# Patient Record
Sex: FEMALE | Race: WHITE | NOT HISPANIC OR LATINO | Employment: FULL TIME | URBAN - METROPOLITAN AREA
[De-identification: names, ages, dates, MRNs, and addresses within clinical notes are randomized per-mention and may not be internally consistent; named-entity substitution may affect disease eponyms.]

---

## 2023-04-12 LAB
ERYTHROCYTE DISTRIBUTION WIDTH (RATIO) BY AUTOMATED COUNT: 11.7 % (ref 11.5–14.5)
ERYTHROCYTE MEAN CORPUSCULAR HEMOGLOBIN CONCENTRATION (G/DL) BY AUTOMATED: 33.2 G/DL (ref 32–36)
ERYTHROCYTE MEAN CORPUSCULAR VOLUME (FL) BY AUTOMATED COUNT: 92 FL (ref 80–100)
ERYTHROCYTES (10*6/UL) IN BLOOD BY AUTOMATED COUNT: 4.3 X10E12/L (ref 4–5.2)
HEMATOCRIT (%) IN BLOOD BY AUTOMATED COUNT: 39.5 % (ref 36–46)
HEMOGLOBIN (G/DL) IN BLOOD: 13.1 G/DL (ref 12–16)
LEUKOCYTES (10*3/UL) IN BLOOD BY AUTOMATED COUNT: 6.8 X10E9/L (ref 4.4–11.3)
NRBC (PER 100 WBCS) BY AUTOMATED COUNT: 0 /100 WBC (ref 0–0)
PLATELETS (10*3/UL) IN BLOOD AUTOMATED COUNT: 335 X10E9/L (ref 150–450)

## 2023-04-25 ENCOUNTER — HOSPITAL ENCOUNTER (OUTPATIENT)
Dept: DATA CONVERSION | Facility: HOSPITAL | Age: 38
End: 2023-04-25
Attending: OBSTETRICS & GYNECOLOGY | Admitting: OBSTETRICS & GYNECOLOGY
Payer: COMMERCIAL

## 2023-04-25 DIAGNOSIS — I10 ESSENTIAL (PRIMARY) HYPERTENSION: ICD-10-CM

## 2023-04-25 DIAGNOSIS — Z30.2 ENCOUNTER FOR STERILIZATION: ICD-10-CM

## 2023-04-25 DIAGNOSIS — Z79.52 LONG TERM (CURRENT) USE OF SYSTEMIC STEROIDS: ICD-10-CM

## 2023-04-25 DIAGNOSIS — E03.9 HYPOTHYROIDISM, UNSPECIFIED: ICD-10-CM

## 2023-05-19 ENCOUNTER — HOSPITAL ENCOUNTER (OUTPATIENT)
Dept: DATA CONVERSION | Facility: HOSPITAL | Age: 38
End: 2023-05-19
Attending: OBSTETRICS & GYNECOLOGY | Admitting: OBSTETRICS & GYNECOLOGY
Payer: COMMERCIAL

## 2023-05-19 DIAGNOSIS — F32.A DEPRESSION, UNSPECIFIED: ICD-10-CM

## 2023-05-19 DIAGNOSIS — I10 ESSENTIAL (PRIMARY) HYPERTENSION: ICD-10-CM

## 2023-05-19 DIAGNOSIS — F41.9 ANXIETY DISORDER, UNSPECIFIED: ICD-10-CM

## 2023-05-19 DIAGNOSIS — E03.9 HYPOTHYROIDISM, UNSPECIFIED: ICD-10-CM

## 2023-05-19 DIAGNOSIS — Z30.2 ENCOUNTER FOR STERILIZATION: ICD-10-CM

## 2023-05-19 DIAGNOSIS — F90.0 ATTENTION-DEFICIT HYPERACTIVITY DISORDER, PREDOMINANTLY INATTENTIVE TYPE: ICD-10-CM

## 2023-05-24 LAB
COMPLETE PATHOLOGY REPORT: NORMAL
CONVERTED CLINICAL DIAGNOSIS-HISTORY: NORMAL
CONVERTED FINAL DIAGNOSIS: NORMAL
CONVERTED FINAL REPORT PDF LINK TO COPY AND PASTE: NORMAL
CONVERTED GROSS DESCRIPTION: NORMAL

## 2023-09-07 VITALS — WEIGHT: 152.34 LBS | HEIGHT: 63 IN | BODY MASS INDEX: 26.99 KG/M2

## 2023-09-07 VITALS — HEIGHT: 63 IN | BODY MASS INDEX: 27.58 KG/M2 | WEIGHT: 155.65 LBS

## 2023-09-30 NOTE — H&P
History of Present Illness:   Pregnant/Lactating:  ·  Are You Pregnant no   ·  Are You Currently Breastfeeding no     History Present Illness:  Reason for surgery: desires permanent sterilization   HPI:    36 yo  presents for laparoscopic bilateral salpingectomy for sterilization.   Pt currently using Mirena IUD for birth control and cycle control.     Med Hx: hypothyroid  Surg Hx: CD x 1        Allergies:        Allergies:  ·  No Known Allergies :     Home Medication Review:   Home Medications Reviewed: yes     Impression/Procedure:   ·  Impression and Planned Procedure: plan for laparoscopic bilateral salpingectomy for permanent sterilization       ERAS (Enhanced Recovery After Surgery):  ·  ERAS Patient: no       Physical Exam by System:    Constitutional: Well developed, awake/alert/oriented  x3, no distress, alert and cooperative   Respiratory/Thorax: Patent airways, CTAB, normal  breath sounds with good chest expansion, thorax symmetric   Cardiovascular: Regular, rate and rhythm, no murmurs,  2+ equal pulses of the extremities   Gastrointestinal: abdomen soft, nontender   Extremities: normal extremities, no cyanosis edema,  contusions or wounds, no clubbing   Psychological: Appropriate mood and behavior   Skin: Warm and dry, no lesions, no rashes     Consent:   COVID-19 Consent:  ·  COVID-19 Risk Consent Surgeon has reviewed key risks related to the risk of wen COVID-19 and if they contract COVID-19 what the risks are.       Electronic Signatures:  Beba Arshad)  (Signed 19-May-2023 14:16)   Authored: History of Present Illness, Allergies, Home  Medication Review, Impression/Procedure, ERAS, Physical Exam, Consent, Note Completion      Last Updated: 19-May-2023 14:16 by Beba Arshad)

## 2023-10-02 NOTE — OP NOTE
Post Operative Note:     PreOp Diagnosis: desires permanent sterilization   Post-Procedure Diagnosis: desires permanent sterilization,  unable to complete surgery today   Procedure: 1.  exam under anesthesia   2.   3.   4.   5.   Surgeon: Dr. Beba Arshad   Resident/Fellow/Other Assistant: Dr. Griselda Lares   Anesthesia: general   Estimated Blood Loss (mL): none   Specimen: no   Findings: supra umbilical and infra umbilical piercings  in place with questionable infection     Operative Report Dictated:  Dictation: not applicable - note contains Operative  Report   Operative Report:    The patient was taken o the operating room where general anesthesia was obtained without difficulty.  The patient's abdomen was then examined to get ready for prep  and pt found to have an infraumbilical and supraumbilical piercing in place with questionable infection of the infraumbilical piercing.  The patient had denied piercings or jewelry in pre-operative questioning.  The patient was previously aware that surgery  would be done at the umbilicus.  Decision was made to discontinue the procedure today d/t risk of infection and risk of removing the piercings against the patient's wishes.  The patient was woken up from anesthesia with no procedure being performed.   The patient was taken to the recovery room in stable condition.       Electronic Signatures:  Beba Arshad)  (Signed 25-Apr-2023 10:45)   Authored: Post Operative Note, Note Completion      Last Updated: 25-Apr-2023 10:45 by Beba Arshad)

## 2023-10-02 NOTE — OP NOTE
Post Operative Note:     PreOp Diagnosis: desires permanent sterilization   Post-Procedure Diagnosis: bilateral salpingectomy   Procedure: 1.  laparoscopic bilateral salpingectomy   2. lysis of adhesions   3.   4.   5.   Surgeon: Dr. Arshad   Resident/Fellow/Other Assistant:    Anesthesia: PADMINI   Estimated Blood Loss (mL): none   Specimen: yes. portion of right fallopian tube ,  left fallopian tube   Findings: dense adhesions from uterus to anterior  abdominal wall ; fallopian tubes adhesed to bilateral sidewalls ; dense mesenteric adhesions all through mid pelvis to anterior abdominal wall   Urine Output: 150 mL     Operative Report Dictated:  Dictation: not applicable - note contains Operative  Report   Operative Report:    The patient was taken o the operating room where general anesthesia was obtained without difficulty.  The patient was then examined under anesthesia and found to  have an anteverted uterus with normal adnexa.  The patient's bladder was emptied via straight cath and clear yellow urine was noted.  She was then placed in dorsal lithotomy position and prepped and draped in the usual sterile fashion.  A speculum was  placed in the vagina and the anterior lip of the cervix was grasped with a single tooth tenaculum.  A uterine maniuplator was placed.  The tenaculum and speculum were removed from the vagina.  Attention was then turned to the abdomen where a 10mm skin  incision was made in the umbilical fold.  A quinton clamp was used to bluntly dissect down to the fascia.  Two S retractors were used to displace the abdominal fat and a kocher clamp was used to grasp the fascia and it was entered sharply with the lyons  scissors.  The incision was palpated and no adhesions found around the fascial incision.  The fascia was tagged with 0 vicryl.  A 10 mm luke trocar was advanced.   Pneumoperitoneium was obtained.  Initial views of the pelvis showed dense adhesive disease.   There was significant  adhesions from the mesentery to the anterior abdominal wall all along the mid pelvis.  A second skin incision was made in the LLQ and a 5 mm trocar was advanced under direct visualization.  The same process was used for third trocar  in the RLQ.  The patient was placed in trendelenburg position. The left fallopian tube was able to be visualized.  It was adhesed to the left pelvic side wall. Immediately adjacent to the fallopian tube the ureter was noted. The right fallopian tube was  not able to be visualized easily d/t the mesenteric adhesions.  The ligasure device was advanced and the adhesions to the right abdominal wall were carefully cauterized and removed.  Careful attention was used to make sure there was no small bowl in the  adhesive tissue.  After removal of adhesions the right fallopian tube was visualized and it was also noted to be adhesed to the right pelvic side wall.  The isthmus of the right fallopian tube was grasped with the ligasure, fulgrated and resected.  The  fallopian tube was fulgrated and resected all along the mesosalpinx until the fimbria.  The fimbrial portion of the tube was noted to be adhesed into the right pelvic sidewall and no attempt was made to remove it as most of the tube had been removed.  The fallopian tube was removed through the right 5 mm port.  The left fallopian tube was stabilized using a grasper and a ligasure device was used to fulgrate and dissect the tube away from the mesosalpinx and the ovary and also used to come across the  tubal insertion into the uterus.   The tube was able to be gently pulled away from the sidewall where filmy adhesions were fulgrated and resected.  The ureter was directly visualized during the resection and at no time was it close to the ligasure device.   The fallopian tube was then removed through the right 5 mm port.  Both fallopian tubes were sent for permanent pathology.   The operative sites were examined and found to be hemostatic.   Careful attention was paid to the mesentery that had been resected  from the anterior abdominal wall and the anterior abdominal wall sites.  Good hemostasis was noted.   The RLQ and LLQ 5 mm trocars were removed under direct visualization with good hemostasis noted.  The 10 mm trocar was then removed from the umbilical  port site and as much CO2 as possible was forced from the abdomen.  The umbilical fascia was closed with 0 vicryl in a running fashion.  The three skin incisions were closed with 4-0 vicryl in a buried fasion and dermabond was applied to the incisions.   The uterine manipulator was removed from the vaginal and good hemostasis was noted on the cervix.  All sponge and needle counts were correct. The patient was taken to the recovery room in stable condition.      Attestation:   Note Completion:  Attending Attestation I performed the procedure without a resident         Electronic Signatures:  Beba Arshad)  (Signed 19-May-2023 15:45)   Authored: Post Operative Note, Note Completion      Last Updated: 19-May-2023 15:45 by Beba Arshad)

## 2024-03-18 ENCOUNTER — OFFICE VISIT (OUTPATIENT)
Dept: OBSTETRICS AND GYNECOLOGY | Facility: CLINIC | Age: 39
End: 2024-03-18
Payer: COMMERCIAL

## 2024-03-18 VITALS
SYSTOLIC BLOOD PRESSURE: 110 MMHG | WEIGHT: 176 LBS | BODY MASS INDEX: 31.18 KG/M2 | HEIGHT: 63 IN | DIASTOLIC BLOOD PRESSURE: 80 MMHG

## 2024-03-18 DIAGNOSIS — R10.31 RLQ ABDOMINAL PAIN: ICD-10-CM

## 2024-03-18 DIAGNOSIS — T83.32XA INTRAUTERINE CONTRACEPTIVE DEVICE THREADS LOST, INITIAL ENCOUNTER: Primary | ICD-10-CM

## 2024-03-18 PROCEDURE — 99213 OFFICE O/P EST LOW 20 MIN: CPT | Performed by: OBSTETRICS & GYNECOLOGY

## 2024-03-18 RX ORDER — LEVOTHYROXINE SODIUM 100 UG/1
TABLET ORAL
COMMUNITY
Start: 2021-11-06

## 2024-03-18 RX ORDER — BUPROPION HYDROCHLORIDE 150 MG/1
TABLET ORAL
COMMUNITY
Start: 2024-03-14

## 2024-03-18 RX ORDER — SERTRALINE HYDROCHLORIDE 100 MG/1
100 TABLET, FILM COATED ORAL
COMMUNITY
Start: 2022-01-26

## 2024-03-18 RX ORDER — PROPRANOLOL HYDROCHLORIDE 80 MG/1
CAPSULE, EXTENDED RELEASE ORAL
COMMUNITY
Start: 2021-11-12

## 2024-03-18 RX ORDER — TRAZODONE HYDROCHLORIDE 150 MG/1
TABLET ORAL
COMMUNITY
Start: 2024-03-01

## 2024-03-18 RX ORDER — NALTREXONE HYDROCHLORIDE 50 MG/1
TABLET, FILM COATED ORAL
COMMUNITY
Start: 2024-03-01

## 2024-03-18 ASSESSMENT — ENCOUNTER SYMPTOMS
HEMATURIA: 0
APPETITE CHANGE: 0
VOMITING: 0
BACK PAIN: 0
ABDOMINAL DISTENTION: 0
FATIGUE: 0
SLEEP DISTURBANCE: 0
NAUSEA: 0
COLOR CHANGE: 0
ABDOMINAL PAIN: 0
DIARRHEA: 0
FREQUENCY: 0
FEVER: 0
SHORTNESS OF BREATH: 0
BLOOD IN STOOL: 0
CHILLS: 0
CONSTIPATION: 0
FLANK PAIN: 0
UNEXPECTED WEIGHT CHANGE: 0
DYSURIA: 0

## 2024-03-18 ASSESSMENT — PAIN SCALES - GENERAL: PAINLEVEL: 5

## 2024-03-18 NOTE — PROGRESS NOTES
"Lambert Llanes is a 38 y.o. No obstetric history on file. here for worried about IUD, having RLQ pain, feels a lump.    Pt has Mirena IUD which was placed .  She is s/p bilateral salpingectomy since that time.   Pt concerned she feels the IUD in her RLQ and is having pain there.  Pt cannot feel IUD strings.    Menstrual cycles: spotting every few months with IUD in place  Sexually active:     Past medical and surgical history reviewed.     Obstetric History  OB History   No obstetric history on file.        Past Medical History  She has a past medical history of Anxiety disorder, unspecified, Encounter for gynecological examination (general) (routine) without abnormal findings, Encounter for insertion of intrauterine contraceptive device (2022), Encounter for routine checking of intrauterine contraceptive device (2022), Encounter for routine checking of intrauterine contraceptive device (2022), Encounter for screening for malignant neoplasm of cervix (2021), Other specified health status (2016), Personal history of gestational diabetes (2015), Personal history of other mental and behavioral disorders, and Personal history of other specified conditions (10/05/2022).    Surgical History  She has a past surgical history that includes  section, low transverse (2015).     Social History  She reports that she has never smoked. She has never used smokeless tobacco. She reports that she does not currently use alcohol. She reports that she does not use drugs.    Family History  No family history on file.      /80   Ht 1.6 m (5' 3\")   Wt 79.8 kg (176 lb)   LMP 2024 (Exact Date)   BMI 31.18 kg/m²   Patient's last menstrual period was 2024 (exact date).    Review of Systems   Constitutional:  Negative for appetite change, chills, fatigue, fever and unexpected weight change.   Respiratory:  Negative for shortness of breath.    Cardiovascular:  Negative " for chest pain.   Gastrointestinal:  Negative for abdominal distention, abdominal pain, blood in stool, constipation, diarrhea, nausea and vomiting.   Endocrine: Negative for cold intolerance and heat intolerance.   Genitourinary:  Positive for pelvic pain. Negative for dyspareunia, dysuria, flank pain, frequency, genital sores, hematuria, menstrual problem, urgency, vaginal bleeding, vaginal discharge and vaginal pain.   Musculoskeletal:  Negative for back pain.   Skin:  Negative for color change.   Psychiatric/Behavioral:  Negative for sleep disturbance.        Physical Exam  Constitutional:       Appearance: Normal appearance.   Abdominal:      General: Abdomen is flat.      Palpations: Abdomen is soft.      Tenderness: There is abdominal tenderness in the right lower quadrant.          Comments: Pain with deep palpation , no mass palpated   Genitourinary:     General: Normal vulva.      Vagina: Normal.      Cervix: Normal.      Uterus: Normal.       Adnexa: Right adnexa normal and left adnexa normal.      Comments: IUD threads not seen on exam   Neurological:      Mental Status: She is alert.   Psychiatric:         Mood and Affect: Mood normal.         Behavior: Behavior normal.           Assessment and Plan:    Pelvic pain and IUD thread not seen on exam  Discussed likely the IUD string is inside uterus/cervical canal and IUD still properly positioned however TATV US ordered to assess IUD position.

## 2024-08-12 ENCOUNTER — APPOINTMENT (OUTPATIENT)
Dept: OBSTETRICS AND GYNECOLOGY | Facility: CLINIC | Age: 39
End: 2024-08-12
Payer: COMMERCIAL

## 2024-08-28 ENCOUNTER — APPOINTMENT (OUTPATIENT)
Dept: OBSTETRICS AND GYNECOLOGY | Facility: CLINIC | Age: 39
End: 2024-08-28
Payer: COMMERCIAL

## 2024-09-18 ENCOUNTER — APPOINTMENT (OUTPATIENT)
Dept: OBSTETRICS AND GYNECOLOGY | Facility: CLINIC | Age: 39
End: 2024-09-18
Payer: COMMERCIAL

## 2024-09-18 VITALS
SYSTOLIC BLOOD PRESSURE: 106 MMHG | HEIGHT: 63 IN | BODY MASS INDEX: 31.86 KG/M2 | DIASTOLIC BLOOD PRESSURE: 68 MMHG | WEIGHT: 179.8 LBS

## 2024-09-18 DIAGNOSIS — Z12.4 CERVICAL CANCER SCREENING: ICD-10-CM

## 2024-09-18 DIAGNOSIS — Z01.419 WELL WOMAN EXAM: Primary | ICD-10-CM

## 2024-09-18 DIAGNOSIS — Z11.3 SCREEN FOR STD (SEXUALLY TRANSMITTED DISEASE): ICD-10-CM

## 2024-09-18 PROCEDURE — 88141 CYTOPATH C/V INTERPRET: CPT | Performed by: PATHOLOGY

## 2024-09-18 PROCEDURE — 87591 N.GONORRHOEAE DNA AMP PROB: CPT

## 2024-09-18 PROCEDURE — 99395 PREV VISIT EST AGE 18-39: CPT | Performed by: OBSTETRICS & GYNECOLOGY

## 2024-09-18 PROCEDURE — 3008F BODY MASS INDEX DOCD: CPT | Performed by: OBSTETRICS & GYNECOLOGY

## 2024-09-18 PROCEDURE — 87491 CHLMYD TRACH DNA AMP PROBE: CPT

## 2024-09-18 PROCEDURE — 1036F TOBACCO NON-USER: CPT | Performed by: OBSTETRICS & GYNECOLOGY

## 2024-09-18 PROCEDURE — 88175 CYTOPATH C/V AUTO FLUID REDO: CPT

## 2024-09-18 PROCEDURE — 87624 HPV HI-RISK TYP POOLED RSLT: CPT

## 2024-09-18 PROCEDURE — 87661 TRICHOMONAS VAGINALIS AMPLIF: CPT

## 2024-09-18 RX ORDER — LEVONORGESTREL 52 MG/1
1 INTRAUTERINE DEVICE INTRAUTERINE ONCE
COMMUNITY

## 2024-09-18 ASSESSMENT — ENCOUNTER SYMPTOMS
UNEXPECTED WEIGHT CHANGE: 0
FLANK PAIN: 0
SHORTNESS OF BREATH: 0
APPETITE CHANGE: 0
VOMITING: 0
ABDOMINAL PAIN: 0
BACK PAIN: 0
SLEEP DISTURBANCE: 0
FEVER: 0
DYSURIA: 0
DIARRHEA: 0
CONSTIPATION: 0
FREQUENCY: 0
NAUSEA: 0
FATIGUE: 0
BLOOD IN STOOL: 0
HEMATURIA: 0
COLOR CHANGE: 0
ABDOMINAL DISTENTION: 0
CHILLS: 0

## 2024-09-18 ASSESSMENT — PAIN SCALES - GENERAL: PAINLEVEL: 0-NO PAIN

## 2024-09-18 NOTE — PROGRESS NOTES
"Lambert Llanes is a 38 y.o. No obstetric history on file. here for well woman exam.    Past med hx and past surg hx reviewed with patient and notable for: no new health issues    Concerns: none    Exercise: none  Daughter in 4th grade    GynHx:  Cycles: very minimal spotting with Mirena IUD  Sexually active: yes with male partner   Contraception: TL ; Mirena IUD , placed 2022  Unable to see strings at last visit. Pt reports she had an US with her PCP that showed intrauterine IUD back in Spring 2024.     No LMP recorded (lmp unknown). Patient has had an implant.     OB History    No obstetric history on file.         Objective     Review of Systems   Constitutional:  Negative for appetite change, chills, fatigue, fever and unexpected weight change.   Respiratory:  Negative for shortness of breath.    Cardiovascular:  Negative for chest pain.   Gastrointestinal:  Negative for abdominal distention, abdominal pain, blood in stool, constipation, diarrhea, nausea and vomiting.   Endocrine: Negative for cold intolerance and heat intolerance.   Genitourinary:  Negative for dyspareunia, dysuria, flank pain, frequency, genital sores, hematuria, menstrual problem, pelvic pain, urgency, vaginal bleeding, vaginal discharge and vaginal pain.   Musculoskeletal:  Negative for back pain.   Skin:  Negative for color change.   Psychiatric/Behavioral:  Negative for sleep disturbance.        /68 (BP Location: Left arm, Patient Position: Sitting)   Ht 1.6 m (5' 3\")   Wt 81.6 kg (179 lb 12.8 oz)   LMP  (LMP Unknown)   BMI 31.85 kg/m²     Physical Exam  Constitutional:       Appearance: Normal appearance.   HENT:      Head: Normocephalic and atraumatic.   Chest:   Breasts:     Right: Normal.      Left: Normal.   Abdominal:      General: Abdomen is flat.      Palpations: Abdomen is soft.      Tenderness: There is no abdominal tenderness.   Genitourinary:     General: Normal vulva.      Vagina: Normal.      Cervix: Normal.      " Uterus: Normal.       Adnexa: Right adnexa normal and left adnexa normal.      Comments: Pap collected today  IUD threads not seen on exam today   Skin:     General: Skin is warm and dry.   Neurological:      Mental Status: She is alert and oriented to person, place, and time.   Psychiatric:         Mood and Affect: Mood normal.          Assessment and Plan:  Routine Well Woman Exam Today.   Discussed diet and exercise and routine health screening.   Pap: pap done today, last pap 2021 ASCUS, HPV+  Discussed mammograms starting at 41 yo or prn concerns     IUD threads not seen on exam  Pt reports recent imaging which showed intrauterine IUD  Cont with Mirena IUD , due for removal 2030      No orders of the defined types were placed in this encounter.

## 2024-09-19 LAB
C TRACH RRNA SPEC QL NAA+PROBE: NEGATIVE
N GONORRHOEA DNA SPEC QL PROBE+SIG AMP: NEGATIVE
T VAGINALIS RRNA SPEC QL NAA+PROBE: NEGATIVE

## 2024-09-30 LAB
CYTOLOGY CMNT CVX/VAG CYTO-IMP: NORMAL
HPV HR 12 DNA GENITAL QL NAA+PROBE: POSITIVE
HPV HR GENOTYPES PNL CVX NAA+PROBE: POSITIVE
HPV16 DNA SPEC QL NAA+PROBE: NEGATIVE
HPV18 DNA SPEC QL NAA+PROBE: NEGATIVE
LAB AP HPV GENOTYPE QUESTION: YES
LAB AP HPV HR: NORMAL
LAB AP PAP ADDITIONAL TESTS: NORMAL
LABORATORY COMMENT REPORT: NORMAL
PATH REPORT.TOTAL CANCER: NORMAL

## 2024-10-02 ENCOUNTER — TELEPHONE (OUTPATIENT)
Dept: OBSTETRICS AND GYNECOLOGY | Facility: CLINIC | Age: 39
End: 2024-10-02
Payer: COMMERCIAL

## 2024-10-02 NOTE — TELEPHONE ENCOUNTER
Patient called back earlier, requesting call back  Stated okay to leave detailed message if no answer  Left detailed message and to call back to schedule and discuss colpo in detail.    LORA Galarza RN      ----- Message from Beba Arshad sent at 10/2/2024  8:31 AM EDT -----  Please notify patient of abnormal pap which shows: ASCUS, HPV+.    .  Recommend colposcopy. Thank you.

## 2024-10-02 NOTE — TELEPHONE ENCOUNTER
Called patient to discuss results  Left vm for patient to return call.    LORA Galarza RN      ----- Message from Beba Arshad sent at 10/2/2024  8:31 AM EDT -----  Please notify patient of abnormal pap which shows: ASCUS, HV+.    .  Recommend colposcopy. Thank you.

## 2024-10-09 ENCOUNTER — APPOINTMENT (OUTPATIENT)
Dept: OBSTETRICS AND GYNECOLOGY | Facility: CLINIC | Age: 39
End: 2024-10-09
Payer: COMMERCIAL

## 2024-10-09 VITALS
WEIGHT: 180 LBS | DIASTOLIC BLOOD PRESSURE: 72 MMHG | BODY MASS INDEX: 31.89 KG/M2 | HEIGHT: 63 IN | SYSTOLIC BLOOD PRESSURE: 111 MMHG

## 2024-10-09 DIAGNOSIS — R87.610 ASCUS WITH POSITIVE HIGH RISK HPV CERVICAL: Primary | ICD-10-CM

## 2024-10-09 DIAGNOSIS — R87.810 ASCUS WITH POSITIVE HIGH RISK HPV CERVICAL: Primary | ICD-10-CM

## 2024-10-09 PROCEDURE — 88305 TISSUE EXAM BY PATHOLOGIST: CPT

## 2024-10-09 PROCEDURE — 88305 TISSUE EXAM BY PATHOLOGIST: CPT | Performed by: PATHOLOGY

## 2024-10-09 PROCEDURE — 57456 ENDOCERV CURETTAGE W/SCOPE: CPT | Performed by: OBSTETRICS & GYNECOLOGY

## 2024-10-09 ASSESSMENT — PAIN SCALES - GENERAL: PAINLEVEL: 0-NO PAIN

## 2024-10-09 NOTE — PROGRESS NOTES
"Patient ID: Lambert Llanes is a 38 y.o. female.  Pt here for colposcopy.  Last pap ASCUS, HPV other+.  Pap prior to that (2021) also ASCUS, HPV other+.   Intermittently abnormal pap since 2008.     Procedure reviewed. All questions answered.     Visit Vitals  /72 (BP Location: Right arm, Patient Position: Sitting)   Ht 1.6 m (5' 3\")   Wt 81.6 kg (180 lb)   LMP  (LMP Unknown)   BMI 31.89 kg/m²   OB Status Implant   Smoking Status Never   BSA 1.9 m²         Colposcopy    Date/Time: 10/9/2024 2:54 PM    Performed by: Beba FARMER MD  Authorized by: Beba FARMER MD    Procedure location: cervix    Consent:     Patient questions answered: yes      Risks and benefits of the procedure and its alternatives discussed: yes      Procedural risks discussed:  Bleeding, infection, possible continued pain and repeat procedure    Consent obtained:  Verbal    Consent given by:  Patient  Universal Protocol:     A time out verifies correct patient, procedure, equipment, support staff, and site/side marked as required: yes      Patient states understanding of procedure being performed: yes      Relevant documents present and verified: yes      Required blood products, implants, devices, and special equipment available: yes    Indication:     Cervical indication(s): high-risk HPV positive and ASCUS    Pre-procedure:     Speculum was placed in the vagina: yes      Prep solution(s): acetic acid    Procedure:     Colposcopy with: endocervical curettage      Cervix visibility: fully visualized      SCJ visibility: not fully visualized      Cervical impression: normal/benign    Post-procedure:     Patient tolerance of procedure:  Patient tolerated the procedure well with no immediate complications      Colposcopy completed today   Results will take 5-7 days and patient aware I will follow up with them regarding results either by phone or [employer] messaging.   Pt instructed she may have some cramping and/or bleeding for the " next 1-2 days. Please use  tylenol or ibuprofen as needed for discomfort.   Pt instructed to avoid vaginal intercourse for 1 night.  No intense exercise today but normal activities are acceptable.  May resume all activities tomorrow.

## 2024-10-14 LAB
LABORATORY COMMENT REPORT: NORMAL
PATH REPORT.FINAL DX SPEC: NORMAL
PATH REPORT.GROSS SPEC: NORMAL
PATH REPORT.RELEVANT HX SPEC: NORMAL
PATH REPORT.TOTAL CANCER: NORMAL

## 2024-10-16 ENCOUNTER — TELEPHONE (OUTPATIENT)
Dept: OBSTETRICS AND GYNECOLOGY | Facility: CLINIC | Age: 39
End: 2024-10-16
Payer: COMMERCIAL

## 2024-10-16 NOTE — TELEPHONE ENCOUNTER
Called patient to discuss results  Identified by name and   Informed patient of results  Patient verbalized understanding, no questions/concerns at this time.    LORA Galarza RN      ----- Message from Beba Arshad sent at 10/16/2024  1:04 PM EDT -----  Please notify patient of benign endocervical sample from recent colposcopy. Recommend repeat pap one year for follow up. Thank you.

## 2025-03-20 ENCOUNTER — OFFICE VISIT (OUTPATIENT)
Dept: URGENT CARE | Age: 40
End: 2025-03-20

## 2025-03-20 VITALS
DIASTOLIC BLOOD PRESSURE: 77 MMHG | RESPIRATION RATE: 16 BRPM | HEART RATE: 65 BPM | SYSTOLIC BLOOD PRESSURE: 118 MMHG | OXYGEN SATURATION: 96 %

## 2025-03-20 DIAGNOSIS — H10.33 ACUTE CONJUNCTIVITIS OF BOTH EYES, UNSPECIFIED ACUTE CONJUNCTIVITIS TYPE: Primary | ICD-10-CM

## 2025-03-20 RX ORDER — DEXAMETHASONE SODIUM PHOSPHATE 1 MG/ML
1 SOLUTION/ DROPS OPHTHALMIC EVERY 6 HOURS
Qty: 5 ML | Refills: 0 | Status: SHIPPED | OUTPATIENT
Start: 2025-03-20 | End: 2025-03-27

## 2025-03-20 RX ORDER — OLOPATADINE HYDROCHLORIDE OPHTHALMIC 2 MG/ML
1 SOLUTION OPHTHALMIC DAILY
Qty: 5 ML | Refills: 0 | Status: SHIPPED | OUTPATIENT
Start: 2025-03-20 | End: 2025-03-27

## 2025-03-20 NOTE — PROGRESS NOTES
Subjective   Patient ID: Lambert Llanes is a 39 y.o. female. They present today with a chief complaint of eye redness (Pt c/o b/l eye redness >1 week, pt treated for bacterial conjunctivitis and has no improvement of symptoms, +redness, tearing, and discomfort with itching, pt denies injury/change in visual acuity).      Past Medical History  Allergies as of 2025    (No Known Allergies)       (Not in a hospital admission)       Past Medical History:   Diagnosis Date    Anxiety disorder, unspecified     Anxiety    Encounter for gynecological examination (general) (routine) without abnormal findings     Encounter for routine gynecological examination    Encounter for insertion of intrauterine contraceptive device 2022    Encounter for IUD insertion    Encounter for routine checking of intrauterine contraceptive device 2022    IUD check up    Encounter for routine checking of intrauterine contraceptive device 2022    IUD check up    Encounter for screening for malignant neoplasm of cervix 2021    Cervical cancer screening    Other specified health status 2016    Contraception    Personal history of gestational diabetes 2015    History of gestational diabetes    Personal history of other mental and behavioral disorders     History of depression    Personal history of other specified conditions 10/05/2022    History of nipple discharge       Past Surgical History:   Procedure Laterality Date     SECTION, LOW TRANSVERSE  2015     Section Low Transverse        reports that she has never smoked. She has never used smokeless tobacco. She reports that she does not currently use alcohol. She reports that she does not use drugs.    Review of Systems  Review of Systems                               Objective    Vitals:    25 1737   BP: 118/77   Pulse: 65   Resp: 16   SpO2: 96%     No LMP recorded. Patient has had an implant.    Physical Exam  Vitals  reviewed.   Constitutional:       General: She is not in acute distress.  HENT:      Head: Normocephalic and atraumatic.      Nose: Nose normal.      Mouth/Throat:      Mouth: Mucous membranes are moist.   Eyes:      General: Lids are normal.         Right eye: No discharge.         Left eye: No discharge.      Extraocular Movements: Extraocular movements intact.      Conjunctiva/sclera:      Right eye: Right conjunctiva is injected.      Left eye: Left conjunctiva is injected.      Pupils: Pupils are equal, round, and reactive to light.   Pulmonary:      Effort: Pulmonary effort is normal.   Skin:     General: Skin is warm.   Neurological:      Mental Status: She is alert and oriented to person, place, and time.   Psychiatric:         Mood and Affect: Mood normal.         Behavior: Behavior normal.             Point of Care Test & Imaging Results from this visit  No results found for this visit on 03/20/25.   No results found.    Diagnostic study results (if any) were reviewed by Zoraida Ledbetter PA-C.    Assessment/Plan   Allergies, medications, history, and pertinent labs/EKGs/Imaging reviewed by Zoraida Ledbetter PA-C.     Medical Decision Making  Patient will start on DEXA Methasone drops and olopatadine drops.  Referral to ophthalmologist is requested.  -         Patient is educated about their diagnoses.     -          Discussed medications benefits and adverse effects.     -          Answered all patient’s questions.     -          Patient will call 911 or go to the nearest ED if worsen symptoms .     -          Patient is agreeable to the plan of care and is deemed stable upon discharge.     -          Follow up with your primary care provider in two days.    Orders and Diagnoses  Diagnoses and all orders for this visit:  Acute conjunctivitis of both eyes, unspecified acute conjunctivitis type  -     dexAMETHasone (Decadron) 0.1 % ophthalmic solution; Administer 1 drop into both eyes every 6 hours for 7 days.  -      olopatadine (Pataday Once Daily Relief) 0.2 % ophthalmic solution; Administer 1 drop into both eyes once daily for 7 days.  -     Referral to Ophthalmology; Future      Medical Admin Record      Patient disposition: Home    Electronically signed by Zoraida Ledbetter PA-C  5:48 PM